# Patient Record
Sex: MALE | Race: WHITE | NOT HISPANIC OR LATINO | Employment: OTHER | URBAN - METROPOLITAN AREA
[De-identification: names, ages, dates, MRNs, and addresses within clinical notes are randomized per-mention and may not be internally consistent; named-entity substitution may affect disease eponyms.]

---

## 2017-11-09 ENCOUNTER — GENERIC CONVERSION - ENCOUNTER (OUTPATIENT)
Dept: OTHER | Facility: OTHER | Age: 82
End: 2017-11-09

## 2018-01-16 NOTE — MISCELLANEOUS
Message   Recorded as Task   Date: 11/08/2017 04:42 PM, Created By: Mookie Bertrand   Task Name: Call Back   Assigned To: Lee PradoB,TEAM   Regarding Patient: Aryan PALMER, Status: In Progress   Comment:    Sis Thomas - 08 Nov 2017 4:42 PM     TASK CREATED  Caller: Self; [phone] (Home)  Pt recieved urinary supplies through [employer] and didn't get 2 bottles of odor eliminatior  He called [facility] and they stated their waiting for authorization from our office  Please advise  [phone]   Leigh Marcano - 08 Nov 2017 4:48 PM     TASK EDITED  NEED TO CALL [facility] TO AUTHORIZE  Leigh Marcano - 08 Nov 2017 4:48 PM     TASK IN PROGRESS   Leigh Marcano - 09 Nov 2017 8:39 AM     TASK EDITED  SPOKE TO Rima Conrad AT [facility], WILL FAX DWO FORM TO AUTHORIZE DEODORANT, PT NOTIFIED          Signatures   Electronically signed by : Kelsy Pizano RN; Nov 9 2017  8:39AM EST                       (Author)

## 2018-01-29 ENCOUNTER — TELEPHONE (OUTPATIENT)
Dept: UROLOGY | Facility: AMBULATORY SURGERY CENTER | Age: 83
End: 2018-01-29

## 2018-06-12 ENCOUNTER — TRANSCRIBE ORDERS (OUTPATIENT)
Dept: ADMINISTRATIVE | Facility: HOSPITAL | Age: 83
End: 2018-06-12

## 2018-06-12 DIAGNOSIS — C67.8 MALIGNANT NEOPLASM OF OVERLAPPING SITES OF BLADDER (HCC): Primary | ICD-10-CM

## 2018-06-18 ENCOUNTER — APPOINTMENT (OUTPATIENT)
Dept: RADIOLOGY | Facility: MEDICAL CENTER | Age: 83
End: 2018-06-18
Attending: UROLOGY
Payer: MEDICARE

## 2018-06-18 ENCOUNTER — HOSPITAL ENCOUNTER (OUTPATIENT)
Dept: ULTRASOUND IMAGING | Facility: MEDICAL CENTER | Age: 83
Discharge: HOME | End: 2018-06-18
Attending: UROLOGY
Payer: MEDICARE

## 2018-06-18 DIAGNOSIS — C67.8 MALIGNANT NEOPLASM OF OVERLAPPING SITES OF BLADDER (HCC): ICD-10-CM

## 2018-06-18 PROCEDURE — 71046 X-RAY EXAM CHEST 2 VIEWS: CPT

## 2018-06-18 PROCEDURE — 76700 US EXAM ABDOM COMPLETE: CPT

## 2018-07-10 ENCOUNTER — OFFICE VISIT (OUTPATIENT)
Dept: UROLOGY | Facility: MEDICAL CENTER | Age: 83
End: 2018-07-10
Payer: MEDICARE

## 2018-07-10 VITALS
SYSTOLIC BLOOD PRESSURE: 150 MMHG | DIASTOLIC BLOOD PRESSURE: 76 MMHG | HEIGHT: 70 IN | BODY MASS INDEX: 27.2 KG/M2 | WEIGHT: 190 LBS

## 2018-07-10 DIAGNOSIS — Z85.51 HISTORY OF BLADDER CANCER: Primary | ICD-10-CM

## 2018-07-10 LAB
SL AMB  POCT GLUCOSE, UA: ABNORMAL
SL AMB LEUKOCYTE ESTERASE,UA: ABNORMAL
SL AMB POCT BILIRUBIN,UA: ABNORMAL
SL AMB POCT BLOOD,UA: ABNORMAL
SL AMB POCT CLARITY,UA: CLEAR
SL AMB POCT COLOR,UA: ABNORMAL
SL AMB POCT KETONES,UA: ABNORMAL
SL AMB POCT NITRITE,UA: ABNORMAL
SL AMB POCT PH,UA: 5.5
SL AMB POCT SPECIFIC GRAVITY,UA: 1.02
SL AMB POCT URINE PROTEIN: ABNORMAL
SL AMB POCT UROBILINOGEN: 0.2

## 2018-07-10 PROCEDURE — 81003 URINALYSIS AUTO W/O SCOPE: CPT | Performed by: UROLOGY

## 2018-07-10 PROCEDURE — 99214 OFFICE O/P EST MOD 30 MIN: CPT | Performed by: UROLOGY

## 2018-07-10 RX ORDER — SPIRONOLACTONE 25 MG
1 TABLET ORAL
COMMUNITY
Start: 2013-04-01

## 2018-07-10 RX ORDER — UBIDECARENONE 100 MG
1 CAPSULE ORAL
COMMUNITY
Start: 2013-08-14

## 2018-07-10 RX ORDER — LOTEPREDNOL ETABONATE 5 MG/G
OINTMENT OPHTHALMIC
Refills: 0 | COMMUNITY
Start: 2018-06-21

## 2018-07-10 RX ORDER — BRIMONIDINE TARTRATE 0.15 %
DROPS OPHTHALMIC (EYE)
Refills: 5 | COMMUNITY
Start: 2018-07-08

## 2018-07-10 RX ORDER — FUROSEMIDE 20 MG/1
TABLET ORAL
Refills: 0 | COMMUNITY
Start: 2018-06-06

## 2018-07-10 RX ORDER — ALBUTEROL SULFATE 2.5 MG/3ML
SOLUTION RESPIRATORY (INHALATION)
Refills: 0 | COMMUNITY
Start: 2018-04-11

## 2018-07-10 RX ORDER — ASPIRIN 81 MG/1
81 TABLET ORAL DAILY
COMMUNITY

## 2018-07-10 RX ORDER — ATORVASTATIN CALCIUM 20 MG/1
TABLET, FILM COATED ORAL
Refills: 0 | COMMUNITY
Start: 2018-06-12

## 2018-07-10 RX ORDER — METFORMIN HYDROCHLORIDE 500 MG/1
TABLET, EXTENDED RELEASE ORAL
Refills: 0 | COMMUNITY
Start: 2018-06-13

## 2018-07-10 RX ORDER — VITAMIN E 268 MG
1 CAPSULE ORAL
COMMUNITY

## 2018-07-10 NOTE — LETTER
July 10, 2018     Myron Johnston DO   [employer] [id number]    Patient: India Gu   YOB: 1935   Date of Visit: 7/10/2018       Dear Dr Nira Skiff: Thank you for referring India Gu to me for evaluation  Below are my notes for this consultation  If you have questions, please do not hesitate to call me  I look forward to following your patient along with you  Sincerely,        Jl Hogan MD        CC: No Recipients  Jl Hogan MD  7/10/2018 11:27 AM  Sign at close encounter  Assessment/Plan:  1  History of bladder cancer  The patient underwent radical cysto prostatectomy and bilateral pelvic lymphadenectomy in March of 2010 by Dr Isaac Govern in since that time has been free of any disease recurrence  He is pleased with functioning of his ileal conduit and notes that his appliance fits does not leak and is changed every 1-2 days  No problem-specific Assessment & Plan notes found for this encounter  Diagnoses and all orders for this visit:    History of bladder cancer  -     POCT urine dip auto non-scope  -     Comprehensive metabolic panel; Future  -     US abdomen complete; Future  -     XR chest pa & lateral; Future    Other orders  -     albuterol (2 5 mg/3 mL) 0 083 % nebulizer solution; INHALE THE CONTENTS OF 1 VIAL IN NEBULIZER EVERY 4 HOURS AS NEEDED FOR WHEEZING   -     Ascorbic Acid 500 MG CAPS; Take 1 tablet by mouth  -     atorvastatin (LIPITOR) 20 mg tablet;   -     B Complex Vitamins (VITAMIN B COMPLEX PO); Take 1 capsule by mouth  -     brimonidine (ALPHAGAN P) 0 15 % ophthalmic solution; INSTILL ONE DROP IN OU BID  -     Calcium-Magnesium-Zinc 333-133-5 MG TABS; Take 1 tablet by mouth  -     Cholecalciferol 3000 units TABS; Take 1 tablet by mouth  -     co-enzyme Q-10 100 mg capsule;  Take 1 capsule by mouth  -     ADVAIR DISKUS 250-50 MCG/DOSE inhaler;   -     furosemide (LASIX) 20 mg tablet; -     LOTEMAX 0 5 % OINT;   -     Lutein 20 MG CAPS; Take 1 capsule by mouth  -     metFORMIN (GLUCOPHAGE-XR) 500 mg 24 hr tablet;   -     vitamin E, tocopherol, 400 units capsule; Take 1 capsule by mouth  -     IRON PO; Take 1 tablet by mouth  -     aspirin (ECOTRIN LOW STRENGTH) 81 mg EC tablet; Take 81 mg by mouth daily          Subjective:   ABDOMEN ULTRASOUND, COMPLETE      INDICATION:   C67 8: Malignant neoplasm of overlapping sites of bladder      COMPARISON:  None     TECHNIQUE:   Real-time ultrasound of the abdomen was performed with a curvilinear transducer with both volumetric sweeps and still imaging techniques      FINDINGS:     PANCREAS:  Portions of the pancreas are obscured by bowel gas  Visualized portions of the pancreas are unremarkable       AORTA AND IVC:  Visualized portions are normal for patient age      LIVER:  Size:  Within normal range  The liver measures 15 3 cm in the midclavicular line  Contour:  Surface contour is smooth  Parenchyma:  Echogenicity and echotexture are within normal limits  No evidence of suspicious mass  Limited imaging of the main portal vein shows it to be patent and hepatopetal      BILIARY:  The gallbladder is normal in caliber  No wall thickening or pericholecystic fluid  No stones or sludge identified  No sonographic Arriaza's sign  No intrahepatic biliary dilatation  CBD measures 4 mm  No choledocholithiasis      KIDNEY:   Right kidney measures 9 8 x 4 8 cm  Within normal limits      Left kidney measures 11 4 x 5 0 cm  Within normal limits      SPLEEN:   Measures 7 8 cm  Within normal limits      ASCITES:  None      IMPRESSION:     Normal       Patient ID: Jadon Alford is a 80 y o  male  HPI 29-year-old male now 8 years status post radical cystoprostatectomy for 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 was noted at the time of cysto prostatectomy  No evidence of malignancy was identified outside of the bladder except for the appendix 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  He denies any urologic problems noting that is ileal conduit functions well without difficulty  The following portions of the patient's history were reviewed and updated as appropriate: allergies, current medications, past family history, past medical history, past social history, past surgical history and problem list     Review of Systems   Constitutional: Positive for activity change and fatigue  HENT: Negative  Respiratory: Positive for shortness of breath  Cardiovascular: Positive for leg swelling  Gastrointestinal:        Right-sided appliance with stoma   Genitourinary: Negative  Musculoskeletal: Negative  Neurological: Negative  Psychiatric/Behavioral: Negative  Objective:      /76   Ht 5' 10" (1 778 m)   Wt 86 2 kg (190 lb)   BMI 27 26 kg/m²           Physical Exam   Constitutional: He is oriented to person, place, and time  He appears well-developed and well-nourished  HENT:   Head: Normocephalic and atraumatic  Eyes: Conjunctivae and EOM are normal    Neck: Normal range of motion  Neck supple  Cardiovascular: Normal rate, regular rhythm and normal heart sounds  Pulmonary/Chest: Effort normal  No respiratory distress  Abdominal: Soft  He exhibits no distension  There is no tenderness  There is no rebound  Viable right sided ileal conduit stoma   Genitourinary: Penis normal    Genitourinary Comments: Scrotum normal without cutaneous lesions  Testes bilaterally descended without masses or adnexal abnormality  There is a left inguinal hernia with intrascrotal component the easily reduces  Lymphadenopathy:     He has no cervical adenopathy  Neurological: He is alert and oriented to person, place, and time  Psychiatric: He has a normal mood and affect   His behavior is normal  Judgment and thought content normal    Vitals reviewed

## 2018-07-10 NOTE — PROGRESS NOTES
Assessment/Plan:  1  History of bladder cancer  The patient underwent radical cysto prostatectomy and bilateral pelvic lymphadenectomy in March of 2010 by Dr Lela Riggins in since that time has been free of any disease recurrence  He is pleased with functioning of his ileal conduit and notes that his appliance fits does not leak and is changed every 1-2 days  No problem-specific Assessment & Plan notes found for this encounter  Diagnoses and all orders for this visit:    History of bladder cancer  -     POCT urine dip auto non-scope  -     Comprehensive metabolic panel; Future  -     US abdomen complete; Future  -     XR chest pa & lateral; Future    Other orders  -     albuterol (2 5 mg/3 mL) 0 083 % nebulizer solution; INHALE THE CONTENTS OF 1 VIAL IN NEBULIZER EVERY 4 HOURS AS NEEDED FOR WHEEZING   -     Ascorbic Acid 500 MG CAPS; Take 1 tablet by mouth  -     atorvastatin (LIPITOR) 20 mg tablet;   -     B Complex Vitamins (VITAMIN B COMPLEX PO); Take 1 capsule by mouth  -     brimonidine (ALPHAGAN P) 0 15 % ophthalmic solution; INSTILL ONE DROP IN OU BID  -     Calcium-Magnesium-Zinc 333-133-5 MG TABS; Take 1 tablet by mouth  -     Cholecalciferol 3000 units TABS; Take 1 tablet by mouth  -     co-enzyme Q-10 100 mg capsule; Take 1 capsule by mouth  -     ADVAIR DISKUS 250-50 MCG/DOSE inhaler;   -     furosemide (LASIX) 20 mg tablet;   -     LOTEMAX 0 5 % OINT;   -     Lutein 20 MG CAPS; Take 1 capsule by mouth  -     metFORMIN (GLUCOPHAGE-XR) 500 mg 24 hr tablet;   -     vitamin E, tocopherol, 400 units capsule; Take 1 capsule by mouth  -     IRON PO; Take 1 tablet by mouth  -     aspirin (ECOTRIN LOW STRENGTH) 81 mg EC tablet;  Take 81 mg by mouth daily          Subjective:   ABDOMEN ULTRASOUND, COMPLETE      INDICATION:   C67 8: Malignant neoplasm of overlapping sites of bladder      COMPARISON:  None     TECHNIQUE:   Real-time ultrasound of the abdomen was performed with a curvilinear transducer with both volumetric sweeps and still imaging techniques      FINDINGS:     PANCREAS:  Portions of the pancreas are obscured by bowel gas  Visualized portions of the pancreas are unremarkable       AORTA AND IVC:  Visualized portions are normal for patient age      LIVER:  Size:  Within normal range  The liver measures 15 3 cm in the midclavicular line  Contour:  Surface contour is smooth  Parenchyma:  Echogenicity and echotexture are within normal limits  No evidence of suspicious mass  Limited imaging of the main portal vein shows it to be patent and hepatopetal      BILIARY:  The gallbladder is normal in caliber  No wall thickening or pericholecystic fluid  No stones or sludge identified  No sonographic Arriaza's sign  No intrahepatic biliary dilatation  CBD measures 4 mm  No choledocholithiasis      KIDNEY:   Right kidney measures 9 8 x 4 8 cm  Within normal limits      Left kidney measures 11 4 x 5 0 cm  Within normal limits      SPLEEN:   Measures 7 8 cm  Within normal limits      ASCITES:  None      IMPRESSION:     Normal       Patient ID: Melinda Pinto is a 80 y o  male  HPI 80-year-old male now 8 years status post radical cystoprostatectomy for 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 was noted at the time of cysto prostatectomy  No evidence of malignancy was identified outside of the bladder except for the appendix 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    He denies any urologic problems noting that is ileal conduit functions well without difficulty  The following portions of the patient's history were reviewed and updated as appropriate: allergies, current medications, past family history, past medical history, past social history, past surgical history and problem list     Review of Systems   Constitutional: Positive for activity change and fatigue  HENT: Negative  Respiratory: Positive for shortness of breath  Cardiovascular: Positive for leg swelling  Gastrointestinal:        Right-sided appliance with stoma   Genitourinary: Negative  Musculoskeletal: Negative  Neurological: Negative  Psychiatric/Behavioral: Negative  Objective:      /76   Ht 5' 10" (1 778 m)   Wt 86 2 kg (190 lb)   BMI 27 26 kg/m²          Physical Exam   Constitutional: He is oriented to person, place, and time  He appears well-developed and well-nourished  HENT:   Head: Normocephalic and atraumatic  Eyes: Conjunctivae and EOM are normal    Neck: Normal range of motion  Neck supple  Cardiovascular: Normal rate, regular rhythm and normal heart sounds  Pulmonary/Chest: Effort normal  No respiratory distress  Abdominal: Soft  He exhibits no distension  There is no tenderness  There is no rebound  Viable right sided ileal conduit stoma   Genitourinary: Penis normal    Genitourinary Comments: Scrotum normal without cutaneous lesions  Testes bilaterally descended without masses or adnexal abnormality  There is a left inguinal hernia with intrascrotal component the easily reduces  Lymphadenopathy:     He has no cervical adenopathy  Neurological: He is alert and oriented to person, place, and time  Psychiatric: He has a normal mood and affect  His behavior is normal  Judgment and thought content normal    Vitals reviewed

## 2019-08-19 ENCOUNTER — TELEPHONE (OUTPATIENT)
Dept: UROLOGY | Facility: MEDICAL CENTER | Age: 84
End: 2019-08-19

## 2019-08-19 NOTE — TELEPHONE ENCOUNTER
Patient of Dr Nisha Wheeler seen in the [place] office  Patient advised that he is calling to schedule if 1 year follow up  Patient is requesting to be seen in the [place] office due to it being closer to his home  Patient also advised that he will need new scripts issued  Please advise

## 2019-09-09 ENCOUNTER — TELEPHONE (OUTPATIENT)
Dept: UROLOGY | Facility: MEDICAL CENTER | Age: 84
End: 2019-09-09

## 2019-09-09 NOTE — TELEPHONE ENCOUNTER
Patient of Dr Krystyna Matson seen in the [place] [facility]  Patient  Is now wanted to be seen in the [place] office  Patient wife called and advised that for the past 2 weeks  She advised that he is tired does not want to eat  When he does eat he gets a lower abdomen pain and than throws up  Patient wife would like to know if patient should go for a catscan  Please advise

## 2019-09-10 NOTE — TELEPHONE ENCOUNTER
Patient of Dr Nagi Best; history bladder cancer s/p cysto prostatectomy, lymphadenectomy March 2010  Patient/wife now interested in transferring care to [place] office, he is scheduled for follow up here in January  Call placed to patient  He states over the past two weeks he has had fatigue, nausea, vomiting, and lower abdominal pain  I advised patient that he should follow up with his PCP regarding these issues  Will route to provider for any further recommendations  Patient verbalized understanding

## 2019-09-12 NOTE — TELEPHONE ENCOUNTER
Called patient - he has followed up with his PCP and is scheduled for some testing  Will keep us posted

## 2021-06-29 ENCOUNTER — TELEPHONE (OUTPATIENT)
Dept: UROLOGY | Facility: MEDICAL CENTER | Age: 86
End: 2021-06-29

## 2021-07-02 ENCOUNTER — TELEPHONE (OUTPATIENT)
Dept: UROLOGY | Facility: MEDICAL CENTER | Age: 86
End: 2021-07-02

## 2021-07-02 NOTE — TELEPHONE ENCOUNTER
Spoke to rep at [facility] and advised signed order for adhensive remover wipes was faxed to them on 6/18/21  Asked if they could correct their records and stop sending additional faxes for this order    Rep stated she corrected their files

## 2022-01-05 ENCOUNTER — TELEPHONE (OUTPATIENT)
Dept: UROLOGY | Facility: MEDICAL CENTER | Age: 87
End: 2022-01-05

## 2022-01-06 NOTE — TELEPHONE ENCOUNTER
Spoke to rep from [facility]  They are requesting medical documentation from our office dated between 10/27/20 and 10/27/21 that addresses pt's ostomy condition, the patient's diagnosis and its duration  This information is needed for Medicare to cover cost of supplies  Advised rep that pt hasn't been since by this office since 7/10/18 and therefore we cannot supply this information  The rep stated that they will reach out to pt for the need to be seen by either this office or another provider so he can receive ostomy supplies